# Patient Record
(demographics unavailable — no encounter records)

---

## 2025-07-18 NOTE — PHYSICAL EXAM
[No HSM] : no hepatosplenomegaly [Normal rectal exam] : exam was normal [None] : no anal fissures seen [Thrombosed] : that was thrombosed [Alert] : alert [Oriented to Person] : oriented to person [Oriented to Place] : oriented to place [Oriented to Time] : oriented to time [Calm] : calm [de-identified] : soft, non-tender external hemorrhoidal mass near the anal verge at 5 o'clock [de-identified] : well appeared, in no acute distress

## 2025-07-18 NOTE — HISTORY OF PRESENT ILLNESS
[FreeTextEntry1] : The patient is a 37-year-old male referred from an urgent care center with a painless perianal lump present for approximately 3 weeks. The referring provider diagnosed this as an external hemorrhoid. The patient denies any associated bleeding, pain, or diarrhea. He reports regular bowel movements (3/week) without constipation and maintains good hydration and fiber intake.  He has been using conservative measures, including sitz baths and dietary modifications; however, there is no significant improvement in the size of the lump. He denies any weight loss and has no family history of colorectal cancer.

## 2025-07-18 NOTE — ASSESSMENT
[FreeTextEntry1] : We had a long discussion on the causes and treatment options of a thrombosed hemorrhoid. I reassured the patient regarding the benign nature of her condition. Given the persistence of the external hemorrhoid despite conservative treatment, I discussed the option of surgical excision (hemorrhoidectomy). The patient raised the concern regarding the postoperative recovery period, particularly due to his responsibilities at home caring for a recent  and another young child. He understands his condition. After discussing the risks, benefits, and recovery expectations, the patient decided to defer surgical intervention at this time and will monitor the symptoms over the next few weeks.  He is going to follow up if symptoms worsen, persist, or he decides to proceed with surgery. I spent 45 minutes with the patient discussing the plan of care. All questions were answered.